# Patient Record
Sex: MALE | Race: WHITE | NOT HISPANIC OR LATINO | URBAN - METROPOLITAN AREA
[De-identification: names, ages, dates, MRNs, and addresses within clinical notes are randomized per-mention and may not be internally consistent; named-entity substitution may affect disease eponyms.]

---

## 2018-03-25 ENCOUNTER — EMERGENCY (EMERGENCY)
Facility: HOSPITAL | Age: 23
LOS: 1 days | Discharge: HOME | End: 2018-03-25
Admitting: EMERGENCY MEDICINE
Payer: COMMERCIAL

## 2018-03-25 VITALS
DIASTOLIC BLOOD PRESSURE: 92 MMHG | SYSTOLIC BLOOD PRESSURE: 143 MMHG | WEIGHT: 184.97 LBS | HEART RATE: 68 BPM | RESPIRATION RATE: 16 BRPM | TEMPERATURE: 99 F | OXYGEN SATURATION: 99 %

## 2018-03-25 PROCEDURE — 99283 EMERGENCY DEPT VISIT LOW MDM: CPT

## 2018-03-25 RX ORDER — BACITRACIN ZINC AND POLYMYXIN B SULFATE 500; 10000 [USP'U]/G; [USP'U]/G
1 OINTMENT OPHTHALMIC
Qty: 1 | Refills: 0 | OUTPATIENT
Start: 2018-03-25 | End: 2018-03-31

## 2018-03-25 NOTE — ED PROVIDER NOTE - MEDICAL DECISION MAKING DETAILS
patient with BL conjuntivitis x 3 days after having URI sx x one week. rx sent for polytrim. advised warm compresses

## 2018-03-25 NOTE — ED ADULT TRIAGE NOTE - CHIEF COMPLAINT QUOTE
Patient c/o sore throat, congestion, subjective fever x 1 week and b/l eye discharge with eye redness started on left eye Friday. Denies cough, N/V/D/C, SOB, sick contacts.

## 2018-03-25 NOTE — ED ADULT NURSE NOTE - CHPI ED SYMPTOMS NEG
no double vision/no pain/no foreign body/no blurred vision/no eye lid swelling/no photophobia/no purulent drainage

## 2018-03-25 NOTE — ED PROVIDER NOTE - OBJECTIVE STATEMENT
Patient presents with one week of nasal congestion, sore throat and subjective fevers. fevers resolved 5 days ago. patient started developing redness over eyes, with clear discharge, and pruritis 3 days ago. states that he wakes up in the mornings with his eyes crusted shut. denies changes in aquity, light sensitivity

## 2018-03-29 DIAGNOSIS — H10.9 UNSPECIFIED CONJUNCTIVITIS: ICD-10-CM

## 2018-03-29 DIAGNOSIS — R50.9 FEVER, UNSPECIFIED: ICD-10-CM

## 2018-03-29 DIAGNOSIS — R05 COUGH: ICD-10-CM

## 2021-10-28 NOTE — ED PROVIDER NOTE - NS ED MD EM SELECTION
[id number] Exp Problem Focused - Mod. Complex Banner Transposition Flap Text: The defect edges were debeveled with a #15 scalpel blade.  Given the location of the defect and the proximity to free margins a Banner transposition flap was deemed most appropriate.  Using a sterile surgical marker, an appropriate flap drawn around the defect. The area thus outlined was incised deep to adipose tissue with a #15 scalpel blade.  The skin margins were undermined to an appropriate distance in all directions utilizing iris scissors.